# Patient Record
(demographics unavailable — no encounter records)

---

## 2024-12-05 NOTE — REASON FOR VISIT
[Patient preference] : as per patient preference [Starting, patient seen in-person within last 6 months] : Telehealth services are being started as patient has seen in-person within last 6 months. [Telehealth (audio & video) - Individual/Group] : This visit was provided via telehealth using real-time 2-way audio visual technology. [Other Location: e.g. Home (Enter Location, City,State)___] : The provider was located at [unfilled]. [Home] : The patient, [unfilled], was located at home, [unfilled], at the time of the visit. [Patient's space is appropriate for telehealth and maintains privacy/confidentiality.] : Patient's space is appropriate for telehealth and maintains privacy/confidentiality. [Participant(s) identity verified] : Participant(s) identity verified. [Verbal consent obtained from patient/other participant(s)] : Verbal consent for telehealth/telephonic services obtained from patient/other participant(s) [FreeTextEntry4] : 3:00pm [FreeTextEntry5] : 3:45pm [Patient] : Patient [FreeTextEntry1] : Pt has been dx with ADHD, BPD. Looking to maintain current level of functioning and develop strategies for managing symptoms.

## 2024-12-05 NOTE — PLAN
[FreeTextEntry2] : Continue to work on strategies for managing symptoms. Stress reduction. [Cognitive and/or Behavior Therapy] : Cognitive and/or Behavior Therapy  [Motivational Interviewing] : Motivational Interviewing  [Psychodynamic Therapy] : Psychodynamic Therapy  [de-identified] :  Patient and provider met for follow-up psychotherapy session. Patient participated in a ~45  ~-minute session /via telehealth (audio and video). Patient arrived on time and was dressed appropriately for the session. Mental status was within normal range. Patient did not endorse any suicidal or homicidal ideation, intent, or plan. Patient and provider checked-in about patient's mood. Pt states that mood has been relatively stable over past 2 weeks, albeit dealing with the stress and overstimulation of raising her high-needs children. Discussed ways of reducing stress and self-care. [Return in ____ week(s)] : Return in [unfilled] week(s) [FreeTextEntry1] :  Patient will participate in individual psychotherapy for 45-60 minutes every 2 weeks.

## 2024-12-05 NOTE — END OF VISIT
[Duration of Psychotherapy Visit (minutes spent in synchronous communication): ____] : Duration of Psychotherapy Visit (minutes spent in synchronous communication): [unfilled] [Individual Psychotherapy for 38-52 minutes] : Individual Psychotherapy for 38 - 52 minutes [Teletherapy Service Provided] : The services provided in this session were delivered via tele-therapy [FreeTextEntry3] : home address [FreeTextEntry5] : home address [Licensed Clinician] : Licensed Clinician

## 2025-01-09 NOTE — PLAN
[FreeTextEntry2] : Continue to work on strategies for emotional regulation and increasing motivation. [Acceptance and Commitment Therapy] : Acceptance and Commitment Therapy  [Cognitive and/or Behavior Therapy] : Cognitive and/or Behavior Therapy  [Psychodynamic Therapy] : Psychodynamic Therapy  [Other: ____] : [unfilled] [de-identified] :  Patient and provider met for follow-up psychotherapy session. Patient participated in a ~60  ~-minute session face to face in office). Patient arrived on time and was dressed appropriately for the session. Mental status was within normal range. Patient did not endorse any suicidal or homicidal ideation, intent, or plan. Patient and provider checked-in about patient's mood. Pt states that she has been struggling with low motivation lately, and finds that there is a difference between the way that she would like life for her children to be and what is. Discussed with Pt. Set goal for changing her family's eating habits. [Return in ____ week(s)] : Return in [unfilled] week(s) [FreeTextEntry1] :  Patient will participate in individual psychotherapy for 45-60 minutes once every two weeks.

## 2025-01-09 NOTE — REASON FOR VISIT
[Patient] : Patient [FreeTextEntry1] : Pt has been dx with Borderline Personality Disorder for last 20 years, as well as Depression with dusty.

## 2025-01-23 NOTE — PLAN
[FreeTextEntry2] : Continue to work on time management, reducing life stressors and depressive/anxious symptoms. [Cognitive and/or Behavior Therapy] : Cognitive and/or Behavior Therapy  [Psychodynamic Therapy] : Psychodynamic Therapy  [Other: ____] : [unfilled] [de-identified] :  Patient and provider met for follow-up psychotherapy session. Patient participated in a ~60  ~-minute session face to face in office. Patient arrived on time and was dressed appropriately for the session. Mental status was within normal range. Patient did not endorse any suicidal or homicidal ideation, intent, or plan. Patient and provider checked-in about patient's mood. Pt states that she has been feeling uncomfortable due to political climate and declaration of coworkers' opinions at work. Discussed with Pt and strategized ways Pt could prioritize mental health during this time. Discussed pros and cons fo boundary-setting in the workplace. [Return in ____ week(s)] : Return in [unfilled] week(s) [FreeTextEntry1] :  Patient will participate in individual psychotherapy for 45-60 minutes every two weeks.

## 2025-02-05 NOTE — REASON FOR VISIT
[Patient] : Patient [FreeTextEntry1] : Pt would like assistance with managing life stressors/anxiety and increasing level of functioning. Pt dx BPD.

## 2025-02-05 NOTE — PLAN
[FreeTextEntry2] : Continue to work on strategies for reducing anxiety and managing life stressors, increasing self-care. [Acceptance and Commitment Therapy] : Acceptance and Commitment Therapy  [Cognitive and/or Behavior Therapy] : Cognitive and/or Behavior Therapy  [Psychodynamic Therapy] : Psychodynamic Therapy  [de-identified] :  Patient and provider met for follow-up psychotherapy session. Patient participated in a ~60  ~-minute session face to face in office). Patient arrived on time and was dressed appropriately for the session. Mental status was within normal range. Patient did not endorse any suicidal or homicidal ideation, intent, or plan. Patient and provider checked-in about patient's mood. Pt states that she has been feeling overwhelmed at the amount of energy spent on managing her children's daily tasks and ADLs. Pt feels that she is overly involved, which leads to stress. Explored and processed with Pt. Discussed possible strategies to alleviate this. [Return in ____ week(s)] : Return in [unfilled] week(s) [FreeTextEntry1] :  Patient will participate in individual psychotherapy for 45-60 minutes every two weeks.

## 2025-02-19 NOTE — PLAN
[FreeTextEntry2] : Continue to work on strategies to address thought patterns that do not serve Pt. [Acceptance and Commitment Therapy] : Acceptance and Commitment Therapy  [Cognitive and/or Behavior Therapy] : Cognitive and/or Behavior Therapy  [Psychodynamic Therapy] : Psychodynamic Therapy  [de-identified] :  Patient and provider met for follow-up psychotherapy session. Patient participated in a ~53  ~-minute session face to face in office). Patient arrived on time and was dressed appropriately for the session. Mental status was within normal range. Patient did not endorse any suicidal or homicidal ideation, intent, or plan. Patient and provider checked-in about patient's mood. Pt states that she has had an increase in anxious symptoms over the past few weeks, and what she believes could be panic attacks. Discussed strategies for reducing symptoms. Pt stated that she will be seeing an MD to rule out physical issues. [Return in ____ week(s)] : Return in [unfilled] week(s) [FreeTextEntry1] :  Patient will participate in individual psychotherapy for 45-60 minutes every two weeks.

## 2025-02-19 NOTE — REASON FOR VISIT
[FreeTextEntry1] : Pt struggles with BPD and depression. Looking to help regulate emotions and maintain current level of functioning.

## 2025-03-12 NOTE — PLAN
[FreeTextEntry2] : Continue to work on stress management strategies and decrease anxious symptoms. [Acceptance and Commitment Therapy] : Acceptance and Commitment Therapy  [Cognitive and/or Behavior Therapy] : Cognitive and/or Behavior Therapy  [Psychodynamic Therapy] : Psychodynamic Therapy  [de-identified] :  Patient and provider met for follow-up psychotherapy session. Patient participated in a session/via telehealth (audio and video). Patient arrived on time and was dressed appropriately for the session. Mental status was within normal range. Patient did not endorse any suicidal or homicidal ideation, intent, or plan. Patient and provider checked-in about patient's mood. Pt states that she has felt stable in mood for the past three weeks. Discussed an interpersonal situation among family members and the fact that it triggers Pt. [Return in ____ week(s)] : Return in [unfilled] week(s) [FreeTextEntry1] :  Patient will participate in individual psychotherapy for 45-60 minutes every two weeks.

## 2025-03-12 NOTE — REASON FOR VISIT
[Patient preference] : as per patient preference [Starting, patient seen in-person within last 6 months] : Telehealth services are being started as patient has seen in-person within last 6 months. [Telehealth (audio & video) - Individual/Group] : This visit was provided via telehealth using real-time 2-way audio visual technology. [Other Location: e.g. Home (Enter Location, City,State)___] : The patient, [unfilled], was located at [unfilled] at the time of the visit. [Patient's space is appropriate for telehealth and maintains privacy/confidentiality.] : Patient's space is appropriate for telehealth and maintains privacy/confidentiality. [Participant(s) identity verified] : Participant(s) identity verified. [Verbal consent obtained from patient/other participant(s)] : Verbal consent for telehealth/telephonic services obtained from patient/other participant(s) [Patient] : Patient [FreeTextEntry1] : Pt struggles with managing life stressors and anxiety.

## 2025-03-26 NOTE — PLAN
[FreeTextEntry2] : Continue to process stressors and work on strategies for stabilizing mood. [Acceptance and Commitment Therapy] : Acceptance and Commitment Therapy  [Cognitive and/or Behavior Therapy] : Cognitive and/or Behavior Therapy  [Psychodynamic Therapy] : Psychodynamic Therapy  [de-identified] :  Patient and provider met for follow-up psychotherapy session. Patient participated in a session face to face in office. Patient arrived on time and was dressed appropriately for the session. Mental status was within normal range. Patient did not endorse any suicidal or homicidal ideation, intent, or plan. Patient and provider checked-in about patient's mood. Pt stated that she has been feeling positive in mood, and revealed that she has taken a 3 day trip to reunite with college friends. Pt was able to identify the positive aspects of the trip, and the role that her connection to her friends plays in her well-being and stress management. Processed with Pt. [Return in ____ week(s)] : Return in [unfilled] week(s) [FreeTextEntry1] :  Patient will participate in individual psychotherapy for 45-60 minutes every 2 weeks.

## 2025-04-23 NOTE — PLAN
[FreeTextEntry2] : Continue to process life stressors and work on managing emotions/maintaining level of functioning. [Acceptance and Commitment Therapy] : Acceptance and Commitment Therapy  [Cognitive and/or Behavior Therapy] : Cognitive and/or Behavior Therapy  [Psychodynamic Therapy] : Psychodynamic Therapy  [de-identified] :  Patient and provider met for follow-up psychotherapy session. Patient participated in a session/via telehealth (audio and video). Patient arrived on time and was dressed appropriately for the session. Mental status was within normal range. Patient did not endorse any suicidal or homicidal ideation, intent, or plan. Patient and provider checked-in about patient's mood. Pt states that she has felt stable in mood for the past few weeks. Discussed work situation and how boundary-setting is something that Pt finds difficult. Strategized communication method to address issue. [Return in ____ week(s)] : Return in [unfilled] week(s) [FreeTextEntry1] :  Patient will participate in individual psychotherapy for 45-60 minutes every 2 weeks.

## 2025-04-23 NOTE — END OF VISIT
[Individual Psychotherapy for 38-52 minutes] : Individual Psychotherapy for 38 - 52 minutes [Teletherapy Service Provided] : The services provided in this session were delivered via tele-therapy [FreeTextEntry5] : home address [FreeTextEntry3] : home address [Licensed Clinician] : Licensed Clinician

## 2025-04-23 NOTE — REASON FOR VISIT
[Patient preference] : as per patient preference [Starting, patient seen in-person within last 6 months] : Telehealth services are being started as patient has seen in-person within last 6 months. [Telehealth (audio & video) - Individual/Group] : This visit was provided via telehealth using real-time 2-way audio visual technology. [Other Location: e.g. Home (Enter Location, City,State)___] : The provider was located at [unfilled]. [Home] : The patient, [unfilled], was located at home, [unfilled], at the time of the visit. [Patient's space is appropriate for telehealth and maintains privacy/confidentiality.] : Patient's space is appropriate for telehealth and maintains privacy/confidentiality. [Participant(s) identity verified] : Participant(s) identity verified. [Verbal consent obtained from patient/other participant(s)] : Verbal consent for telehealth/telephonic services obtained from patient/other participant(s) [Patient] : Patient [FreeTextEntry1] : Pt struggles with some symptoms of depression, managing life stressors. Formerly Dx with Borderline personality disorder.

## 2025-05-28 NOTE — PLAN
[FreeTextEntry2] : Continue to work on strategies for managing symptoms and process emotions. [Acceptance and Commitment Therapy] : Acceptance and Commitment Therapy  [Cognitive and/or Behavior Therapy] : Cognitive and/or Behavior Therapy  [Psychodynamic Therapy] : Psychodynamic Therapy  [de-identified] :  Patient and provider met for follow-up psychotherapy session. Patient participated in a  session face to face in office. Patient arrived on time and was dressed appropriately for the session. Mental status was within normal range. Patient did not endorse any suicidal or homicidal ideation, intent, or plan. Patient and provider checked-in about patient's mood. Pt states that she has been feeling overwhelmed with extra temporary work hours while managing her own household. Processed with Pt. [Return in ____ week(s)] : Return in [unfilled] week(s) [FreeTextEntry1] :  Patient will participate in individual psychotherapy for 45-60 minutes every 2 weeks.
